# Patient Record
Sex: MALE | Race: WHITE | NOT HISPANIC OR LATINO
[De-identification: names, ages, dates, MRNs, and addresses within clinical notes are randomized per-mention and may not be internally consistent; named-entity substitution may affect disease eponyms.]

---

## 2018-01-30 ENCOUNTER — CHARTING TRANS (OUTPATIENT)
Dept: OTHER | Age: 58
End: 2018-01-30

## 2018-01-30 ENCOUNTER — LAB SERVICES (OUTPATIENT)
Dept: OTHER | Age: 58
End: 2018-01-30

## 2018-01-30 LAB
FLU A AG RAPID SCREEN: NEGATIVE
FLU B AG RAPID SCREEN: NEGATIVE
FLU RAPID SCREEN: NORMAL
SOURCE: NORMAL

## 2018-05-24 PROBLEM — I83.893 VARICOSE VEINS OF BOTH LOWER EXTREMITIES WITH COMPLICATIONS: Status: ACTIVE | Noted: 2018-05-24

## 2018-05-24 PROBLEM — I83.93 SPIDER VEINS OF BOTH LOWER EXTREMITIES: Status: ACTIVE | Noted: 2018-05-24

## 2018-06-28 PROBLEM — I83.891 VARICOSE VEINS OF RIGHT LOWER EXTREMITY WITH COMPLICATIONS: Status: ACTIVE | Noted: 2018-05-24

## 2018-08-03 ENCOUNTER — CHARTING TRANS (OUTPATIENT)
Dept: OTHER | Age: 58
End: 2018-08-03

## 2018-09-11 ENCOUNTER — ANESTHESIA EVENT (OUTPATIENT)
Dept: CARDIAC SURGERY | Facility: HOSPITAL | Age: 58
End: 2018-09-11
Payer: COMMERCIAL

## 2018-09-11 NOTE — ANESTHESIA PREPROCEDURE EVALUATION
PRE-OP EVALUATION    Patient Name: Jerica Ortiz    Pre-op Diagnosis: varicose veins of right lower extremity with complications, asymptomatic varicose veins of bilateral lower extremities  Dr. Kvng Dyson person    Procedure(s):  right leg stab normal.                 Other findings            ASA: 2   Plan: general  NPO status verified and patient meets guidelines. Patient has not taken beta blockers in last 24 hours. Post-procedure pain management plan discussed with surgeon and patient.     C

## 2018-09-12 ENCOUNTER — ANESTHESIA (OUTPATIENT)
Dept: CARDIAC SURGERY | Facility: HOSPITAL | Age: 58
End: 2018-09-12
Payer: COMMERCIAL

## 2018-09-12 ENCOUNTER — HOSPITAL ENCOUNTER (OUTPATIENT)
Facility: HOSPITAL | Age: 58
Setting detail: HOSPITAL OUTPATIENT SURGERY
Discharge: HOME | End: 2018-09-12
Attending: SURGERY | Admitting: SURGERY
Payer: COMMERCIAL

## 2018-09-12 VITALS
DIASTOLIC BLOOD PRESSURE: 84 MMHG | HEART RATE: 65 BPM | OXYGEN SATURATION: 99 % | TEMPERATURE: 98 F | RESPIRATION RATE: 20 BRPM | HEIGHT: 75 IN | WEIGHT: 261 LBS | BODY MASS INDEX: 32.45 KG/M2 | SYSTOLIC BLOOD PRESSURE: 124 MMHG

## 2018-09-12 LAB
ALBUMIN SERPL-MCNC: 3.5 G/DL (ref 3.5–4.8)
ALBUMIN/GLOB SERPL: 1.1 {RATIO} (ref 1–2)
ALP LIVER SERPL-CCNC: 88 U/L (ref 45–117)
ALT SERPL-CCNC: 30 U/L (ref 17–63)
ANION GAP SERPL CALC-SCNC: 4 MMOL/L (ref 0–18)
AST SERPL-CCNC: 21 U/L (ref 15–41)
ATRIAL RATE: 61 BPM
BASOPHILS # BLD AUTO: 0.05 X10(3) UL (ref 0–0.1)
BASOPHILS NFR BLD AUTO: 0.8 %
BILIRUB SERPL-MCNC: 0.6 MG/DL (ref 0.1–2)
BUN BLD-MCNC: 17 MG/DL (ref 8–20)
BUN/CREAT SERPL: 17.7 (ref 10–20)
CALCIUM BLD-MCNC: 8.5 MG/DL (ref 8.3–10.3)
CHLORIDE SERPL-SCNC: 109 MMOL/L (ref 101–111)
CO2 SERPL-SCNC: 29 MMOL/L (ref 22–32)
CREAT BLD-MCNC: 0.96 MG/DL (ref 0.7–1.3)
EOSINOPHIL # BLD AUTO: 0 X10(3) UL (ref 0–0.3)
EOSINOPHIL NFR BLD AUTO: 0 %
ERYTHROCYTE [DISTWIDTH] IN BLOOD BY AUTOMATED COUNT: 12.1 % (ref 11.5–16)
GLOBULIN PLAS-MCNC: 3.3 G/DL (ref 2.5–4)
GLUCOSE BLD-MCNC: 103 MG/DL (ref 70–99)
HCT VFR BLD AUTO: 39.5 % (ref 37–53)
HGB BLD-MCNC: 13.5 G/DL (ref 13–17)
IMMATURE GRANULOCYTE COUNT: 0.01 X10(3) UL (ref 0–1)
IMMATURE GRANULOCYTE RATIO %: 0.2 %
LYMPHOCYTES # BLD AUTO: 1.59 X10(3) UL (ref 0.9–4)
LYMPHOCYTES NFR BLD AUTO: 25.8 %
M PROTEIN MFR SERPL ELPH: 6.8 G/DL (ref 6.1–8.3)
MCH RBC QN AUTO: 32.3 PG (ref 27–33.2)
MCHC RBC AUTO-ENTMCNC: 34.2 G/DL (ref 31–37)
MCV RBC AUTO: 94.5 FL (ref 80–99)
MONOCYTES # BLD AUTO: 0.52 X10(3) UL (ref 0.1–1)
MONOCYTES NFR BLD AUTO: 8.4 %
NEUTROPHIL ABS PRELIM: 3.99 X10 (3) UL (ref 1.3–6.7)
NEUTROPHILS # BLD AUTO: 3.99 X10(3) UL (ref 1.3–6.7)
NEUTROPHILS NFR BLD AUTO: 64.8 %
OSMOLALITY SERPL CALC.SUM OF ELEC: 296 MOSM/KG (ref 275–295)
P AXIS: 21 DEGREES
P-R INTERVAL: 144 MS
PLATELET # BLD AUTO: 255 10(3)UL (ref 150–450)
POTASSIUM SERPL-SCNC: 4.4 MMOL/L (ref 3.6–5.1)
Q-T INTERVAL: 426 MS
QRS DURATION: 100 MS
QTC CALCULATION (BEZET): 428 MS
R AXIS: -17 DEGREES
RBC # BLD AUTO: 4.18 X10(6)UL (ref 4.3–5.7)
RED CELL DISTRIBUTION WIDTH-SD: 42.4 FL (ref 35.1–46.3)
SODIUM SERPL-SCNC: 142 MMOL/L (ref 136–144)
T AXIS: 21 DEGREES
VENTRICULAR RATE: 61 BPM
WBC # BLD AUTO: 6.2 X10(3) UL (ref 4–13)

## 2018-09-12 PROCEDURE — 80053 COMPREHEN METABOLIC PANEL: CPT | Performed by: SURGERY

## 2018-09-12 PROCEDURE — 85025 COMPLETE CBC W/AUTO DIFF WBC: CPT | Performed by: SURGERY

## 2018-09-12 PROCEDURE — 36415 COLL VENOUS BLD VENIPUNCTURE: CPT | Performed by: SURGERY

## 2018-09-12 PROCEDURE — 06DY3ZZ EXTRACTION OF LOWER VEIN, PERCUTANEOUS APPROACH: ICD-10-PCS | Performed by: SURGERY

## 2018-09-12 PROCEDURE — 93005 ELECTROCARDIOGRAM TRACING: CPT

## 2018-09-12 PROCEDURE — 93010 ELECTROCARDIOGRAM REPORT: CPT | Performed by: INTERNAL MEDICINE

## 2018-09-12 RX ORDER — NALOXONE HYDROCHLORIDE 0.4 MG/ML
80 INJECTION, SOLUTION INTRAMUSCULAR; INTRAVENOUS; SUBCUTANEOUS AS NEEDED
Status: DISCONTINUED | OUTPATIENT
Start: 2018-09-12 | End: 2018-09-12

## 2018-09-12 RX ORDER — MORPHINE SULFATE 2 MG/ML
2 INJECTION, SOLUTION INTRAMUSCULAR; INTRAVENOUS EVERY 5 MIN PRN
Status: DISCONTINUED | OUTPATIENT
Start: 2018-09-12 | End: 2018-09-12

## 2018-09-12 RX ORDER — DEXAMETHASONE SODIUM PHOSPHATE 4 MG/ML
VIAL (ML) INJECTION
Status: COMPLETED
Start: 2018-09-12 | End: 2018-09-12

## 2018-09-12 RX ORDER — HYDROCODONE BITARTRATE AND ACETAMINOPHEN 5; 325 MG/1; MG/1
1-2 TABLET ORAL EVERY 4 HOURS PRN
Qty: 10 TABLET | Refills: 0 | Status: SHIPPED | OUTPATIENT
Start: 2018-09-12 | End: 2018-09-14

## 2018-09-12 RX ORDER — HYDROCODONE BITARTRATE AND ACETAMINOPHEN 5; 325 MG/1; MG/1
1 TABLET ORAL AS NEEDED
Status: DISCONTINUED | OUTPATIENT
Start: 2018-09-12 | End: 2018-09-12

## 2018-09-12 RX ORDER — CEFAZOLIN SODIUM 1 G/3ML
INJECTION, POWDER, FOR SOLUTION INTRAMUSCULAR; INTRAVENOUS
Status: DISCONTINUED | OUTPATIENT
Start: 2018-09-12 | End: 2018-09-12 | Stop reason: HOSPADM

## 2018-09-12 RX ORDER — SODIUM CHLORIDE, SODIUM LACTATE, POTASSIUM CHLORIDE, CALCIUM CHLORIDE 600; 310; 30; 20 MG/100ML; MG/100ML; MG/100ML; MG/100ML
INJECTION, SOLUTION INTRAVENOUS CONTINUOUS
Status: DISCONTINUED | OUTPATIENT
Start: 2018-09-12 | End: 2018-09-12

## 2018-09-12 RX ORDER — ONDANSETRON 2 MG/ML
INJECTION INTRAMUSCULAR; INTRAVENOUS
Status: COMPLETED
Start: 2018-09-12 | End: 2018-09-12

## 2018-09-12 RX ORDER — HYDROCODONE BITARTRATE AND ACETAMINOPHEN 5; 325 MG/1; MG/1
2 TABLET ORAL AS NEEDED
Status: DISCONTINUED | OUTPATIENT
Start: 2018-09-12 | End: 2018-09-12

## 2018-09-12 RX ORDER — BUPIVACAINE HYDROCHLORIDE AND EPINEPHRINE 5; 5 MG/ML; UG/ML
INJECTION, SOLUTION EPIDURAL; INTRACAUDAL; PERINEURAL AS NEEDED
Status: DISCONTINUED | OUTPATIENT
Start: 2018-09-12 | End: 2018-09-12 | Stop reason: HOSPADM

## 2018-09-12 RX ORDER — DEXAMETHASONE SODIUM PHOSPHATE 4 MG/ML
4 VIAL (ML) INJECTION EVERY 6 HOURS
Status: DISCONTINUED | OUTPATIENT
Start: 2018-09-12 | End: 2018-09-12

## 2018-09-12 RX ORDER — ACETAMINOPHEN 500 MG
1000 TABLET ORAL ONCE AS NEEDED
Status: DISCONTINUED | OUTPATIENT
Start: 2018-09-12 | End: 2018-09-12

## 2018-09-12 RX ORDER — ONDANSETRON 2 MG/ML
4 INJECTION INTRAMUSCULAR; INTRAVENOUS ONCE
Status: COMPLETED | OUTPATIENT
Start: 2018-09-12 | End: 2018-09-12

## 2018-09-12 NOTE — OR NURSING
Patient was assisted to BR to void. Surgical site began dripping dark red blood at lower inner calf site(about 50cc on floor). Patient assisted back to cart with leg dressing reinforced and leg elevated. Patient denies pain, VS stable.  Plan to phone ramon

## 2018-09-12 NOTE — OR NURSING
Report to Seth Altamirano. Patient resting with leg elevated. Outer Ace wrap removed and rewrapped,no new drainage.  Wife at side with instructions reviewed to keep leg elevated as much as possible per Dr Herminio Isaac and to call surgeon with any questions or concerns(I

## 2018-09-12 NOTE — OPERATIVE REPORT
[address]    PATIENT'S NAME: Aaron ESCOBAR   ATTENDING PHYSICIAN: Libby Mccain. Delaine Runner, M.D. OPERATING PHYSICIAN: Freada Nam A. Delaine Runner, M.D.    PATIENT ACCOUNT#:   [de-identified]    LOCATION:  [address] [place] [address] 10  MEDICAL RECORD #:   [id number] Marcaine 0.5% with epinephrine and all oozing stopped. After completion of the Steri-Strip closure of the 36 incisions, the patient's wounds were wrapped with 4x4, gauze, and Webril followed by Coban.   A final wrapping of a 4-inch and 6-inch Ace wrap nicole

## 2018-09-12 NOTE — BRIEF OP NOTE
Pre-Operative Diagnosis: varicose veins of right lower extremity with complications     Post-Operative Diagnosis: same     Procedure Performed:   Procedure(s):  right leg stab phlebectomy- multiple (36 incisions)      Surgeon(s) and Role:     Ellen Swift

## 2018-09-12 NOTE — OR NURSING
Dr Kwaku Merino phoned and updated. Plan to discharge home after one more hour of observation with leg elevated and wrapped. Patient to keep elevated at home as much as possible  till appointment tomorrow.

## 2018-09-12 NOTE — ANESTHESIA POSTPROCEDURE EVALUATION
[facility] Patient Status:  Inpatient   Age/Gender 59year old male MRN [id number]   Location [address] [facility] Attending Sheryl Moore MD   Hosp Day # 0 PCP Vangie Sweet MD       Anesthesia Post-op Not

## 2018-09-13 NOTE — H&P
History & Physical Examination    Patient Name: Gardenia Novoa  MRN: [id number]  [facility]: [id number]  YOB: 1960    Diagnosis: Varicose veins with complications    Present Illness: Patient is a 59year old male who is admitted today for stab avul Debra Arshad DPM;  Location: [facility]  2014: OTHER SURGICAL HISTORY      Comment:  Oral surgery  Family History   Problem Relation Age of Onset   • Diabetes Father      Social History    Tobacco Use      Smoking status: Never Smoker      Sm

## 2018-11-02 VITALS
TEMPERATURE: 98.1 F | OXYGEN SATURATION: 96 % | SYSTOLIC BLOOD PRESSURE: 120 MMHG | RESPIRATION RATE: 16 BRPM | DIASTOLIC BLOOD PRESSURE: 68 MMHG | HEART RATE: 86 BPM

## 2019-08-05 ENCOUNTER — WALK IN (OUTPATIENT)
Dept: URGENT CARE | Age: 59
End: 2019-08-05

## 2019-08-05 VITALS
RESPIRATION RATE: 18 BRPM | TEMPERATURE: 98.4 F | HEART RATE: 80 BPM | BODY MASS INDEX: 31.08 KG/M2 | WEIGHT: 250 LBS | DIASTOLIC BLOOD PRESSURE: 74 MMHG | HEIGHT: 75 IN | SYSTOLIC BLOOD PRESSURE: 118 MMHG

## 2019-08-05 DIAGNOSIS — J02.9 SORE THROAT: Primary | ICD-10-CM

## 2019-08-05 DIAGNOSIS — J02.0 ACUTE STREPTOCOCCAL PHARYNGITIS: ICD-10-CM

## 2019-08-05 LAB
INTERNAL PROCEDURAL CONTROLS ACCEPTABLE: YES
S PYO AG THROAT QL IA.RAPID: POSITIVE

## 2019-08-05 PROCEDURE — 87880 STREP A ASSAY W/OPTIC: CPT | Performed by: NURSE PRACTITIONER

## 2019-08-05 PROCEDURE — 99214 OFFICE O/P EST MOD 30 MIN: CPT | Performed by: NURSE PRACTITIONER

## 2019-08-05 RX ORDER — BENZONATATE 100 MG/1
100 CAPSULE ORAL 3 TIMES DAILY PRN
Qty: 20 CAPSULE | Refills: 0 | Status: SHIPPED | OUTPATIENT
Start: 2019-08-05 | End: 2019-08-05 | Stop reason: SDUPTHER

## 2019-08-05 RX ORDER — AMOXICILLIN 875 MG/1
875 TABLET, COATED ORAL 2 TIMES DAILY
Qty: 20 TABLET | Refills: 0 | Status: SHIPPED | OUTPATIENT
Start: 2019-08-05 | End: 2019-08-05 | Stop reason: SDUPTHER

## 2019-08-05 RX ORDER — BENZONATATE 100 MG/1
100 CAPSULE ORAL 3 TIMES DAILY PRN
Qty: 20 CAPSULE | Refills: 0 | Status: SHIPPED | OUTPATIENT
Start: 2019-08-05

## 2019-08-05 RX ORDER — AMOXICILLIN 875 MG/1
875 TABLET, COATED ORAL 2 TIMES DAILY
Qty: 20 TABLET | Refills: 0 | Status: SHIPPED | OUTPATIENT
Start: 2019-08-05 | End: 2019-08-15

## 2019-08-05 ASSESSMENT — ENCOUNTER SYMPTOMS
TROUBLE SWALLOWING: 0
COUGH: 1
CHILLS: 0
SINUS PRESSURE: 0
RHINORRHEA: 0
VOICE CHANGE: 0
CHEST TIGHTNESS: 0
FATIGUE: 1
STRIDOR: 0
SHORTNESS OF BREATH: 0
FEVER: 0
WHEEZING: 0
EYES NEGATIVE: 1
SORE THROAT: 1

## 2019-10-11 ENCOUNTER — WALK IN (OUTPATIENT)
Dept: URGENT CARE | Age: 59
End: 2019-10-11

## 2019-10-11 DIAGNOSIS — H61.23 BILATERAL IMPACTED CERUMEN: Primary | ICD-10-CM

## 2019-10-11 PROCEDURE — 69210 REMOVE IMPACTED EAR WAX UNI: CPT | Performed by: NURSE PRACTITIONER

## 2019-10-11 RX ORDER — OFLOXACIN 3 MG/ML
5 SOLUTION AURICULAR (OTIC) DAILY
Qty: 5 ML | Refills: 0 | Status: SHIPPED | OUTPATIENT
Start: 2019-10-11

## 2019-10-11 ASSESSMENT — PAIN SCALES - GENERAL: PAINLEVEL: 0

## 2021-05-26 VITALS
RESPIRATION RATE: 16 BRPM | WEIGHT: 250 LBS | HEART RATE: 61 BPM | DIASTOLIC BLOOD PRESSURE: 78 MMHG | SYSTOLIC BLOOD PRESSURE: 118 MMHG | TEMPERATURE: 97.8 F | HEIGHT: 75 IN | OXYGEN SATURATION: 98 % | BODY MASS INDEX: 31.08 KG/M2

## 2021-10-11 PROBLEM — H25.9 AGE-RELATED CATARACT OF BOTH EYES, UNSPECIFIED AGE-RELATED CATARACT TYPE: Status: ACTIVE | Noted: 2021-10-11

## 2024-01-23 ENCOUNTER — HOSPITAL ENCOUNTER (EMERGENCY)
Age: 64
Discharge: HOME | End: 2024-01-23
Attending: EMERGENCY MEDICINE
Payer: COMMERCIAL

## 2024-01-23 VITALS
HEIGHT: 75 IN | BODY MASS INDEX: 31.08 KG/M2 | SYSTOLIC BLOOD PRESSURE: 141 MMHG | OXYGEN SATURATION: 98 % | RESPIRATION RATE: 16 BRPM | TEMPERATURE: 98 F | HEART RATE: 74 BPM | DIASTOLIC BLOOD PRESSURE: 87 MMHG | WEIGHT: 250 LBS

## 2024-01-23 DIAGNOSIS — S01.01XA LACERATION OF SCALP, INITIAL ENCOUNTER: Primary | ICD-10-CM

## 2024-01-23 PROCEDURE — 99283 EMERGENCY DEPT VISIT LOW MDM: CPT

## 2024-01-23 PROCEDURE — 12002 RPR S/N/AX/GEN/TRNK2.6-7.5CM: CPT

## 2024-01-23 PROCEDURE — 90471 IMMUNIZATION ADMIN: CPT

## 2024-01-23 NOTE — ED PROVIDER NOTES
Laceration repair: Prior to procedure, documentation was reviewed, informed consent was obtained, appropriate equipment was present and a time out was performed to identify the correct patient, procedure and site.     Anesthesia Type: Lidocaine 2% with epi  Location: Posterior scalp  Size: 4 cm  Shape: Linear  FB present: None noted  Cleansing/irrigation: Normal saline under pressure  Wound Closure: Staples x 7  DressingType: Neosporin ointment  Td: Given today  PT Tolerated:without complaint    Total procedure time 10 minutes  Estimated blood loss was less than 0.5 mL. A dressing was applied to the area and anticipatory guidance, as well as standard post-procedure care, was explained. Return precautions are given. The patient tolerated the procedure well without complications.  Follow-up visit set for suture removal and evaluation of the laceration.

## 2024-01-23 NOTE — DISCHARGE INSTRUCTIONS
Wash gently with regular shampoo.  Staples should be removed in about 7 days.    Return to the ER for severe headache, intractable vomiting, mental status changes or any other concerns.  
Statement Selected

## 2024-01-23 NOTE — ED PROVIDER NOTES
Patient Seen in: [facility] In [place]      History     Chief Complaint   Patient presents with    Trauma     Stated Complaint: lac to back of head after fall    Subjective:   HPI    Patient is a 63-year-old male who presents with a laceration over his occipital scalp after a fall at home.  He slipped on ice exiting the garage and went backwards, striking the back of his head.  Minimal headache, no loss of consciousness, nausea, lightheadedness.  No other injuries or complaints.    Objective:   Past Medical History:   Diagnosis Date    COVID 03/2020    no residual effects     Spider veins of both lower extremities 5/24/2018    Spider veins of both lower extremities 5/24/2018    Varicose veins of both lower extremities with complications 5/24/2018              Past Surgical History:   Procedure Laterality Date    CATARACT Left 11/01/2021    HALLUX RIGIDUS CORRECTION Right 4/7/2016    Procedure: CHEILECTOMY;  Surgeon: Jw Weinstein DPM;  Location: [facility]    OTHER SURGICAL HISTORY  2014    Oral surgery                No pertinent social history.            Review of Systems    Positive for stated complaint: lac to back of head after fall  Other systems are as noted in HPI.  Constitutional and vital signs reviewed.      All other systems reviewed and negative except as noted above.    Physical Exam     ED Triage Vitals [01/23/24 0802]   /87   Pulse 74   Resp 16   Temp 97.6 °F (36.4 °C)   Temp src Oral   SpO2 98 %   O2 Device None (Room air)       Current:/87   Pulse 74   Temp 97.6 °F (36.4 °C) (Oral)   Resp 16   Ht 190.5 cm (6' 3\")   Wt 113.4 kg   SpO2 98%   BMI 31.25 kg/m²         Physical Exam  Vitals and nursing note reviewed.   Constitutional:       Appearance: He is well-developed.   HENT:      Head: Normocephalic.      Comments: 4 cm linear laceration over the occipital scalp with mild surrounding soft tissue swelling.  Eyes:      Conjunctiva/sclera:  Conjunctivae normal.      Pupils: Pupils are equal, round, and reactive to light.   Cardiovascular:      Rate and Rhythm: Normal rate and regular rhythm.      Heart sounds: Normal heart sounds.   Pulmonary:      Effort: Pulmonary effort is normal.      Breath sounds: Normal breath sounds.   Abdominal:      General: Bowel sounds are normal.      Palpations: Abdomen is soft.   Musculoskeletal:         General: Normal range of motion.      Cervical back: Normal range of motion and neck supple.   Skin:     General: Skin is warm and dry.   Neurological:      Mental Status: He is alert and oriented to person, place, and time.               ED Course   Labs Reviewed - No data to display                   MDM      63-year-old male presenting with an occipital scalp laceration sustained after fall at home.  Slipped on ice and fell backwards, striking his head.  No other injuries or complaints.  Discussed a CT but he is headache is fairly minimal and he has no nausea, lightheadedness, mental status changes suspicion for skull fracture or ICH is low.  Laceration will be irrigated and closed.      Update at 9 AM.  Laceration repaired as noted.  Tetanus updated.  Instructed on wound care as well as return precautions.      Past Medical History-none    Differential diagnosis before testing included skull fracture, ICH    Co-morbidities that add to the complexity of management include: None    Testing ordered during this visit included none      History obtained by an independent source included from wife at bedside            Disposition:        Discharge  I have discussed with the patient the results of test, differential diagnosis, treatment plan, warning signs and symptoms which should prompt immediate return.  They expressed understanding of these instructions and agrees to the following plan provided.  They were given written discharge instructions and agrees to return for any concerns and voiced understanding and all  questions were answered.                           Medical Decision Making      Disposition and Plan     Clinical Impression:  1. Laceration of scalp, initial encounter         Disposition:  Discharge  1/23/2024  9:00 am    Follow-up:  Bulmaro Yan MD  [address] [place] [address] [phone]    Follow up            Medications Prescribed:  Current Discharge Medication List

## 2024-02-02 ENCOUNTER — HOSPITAL ENCOUNTER (EMERGENCY)
Age: 64
Discharge: HOME | End: 2024-02-02
Attending: EMERGENCY MEDICINE
Payer: COMMERCIAL

## 2024-02-02 VITALS
HEART RATE: 66 BPM | BODY MASS INDEX: 31 KG/M2 | OXYGEN SATURATION: 100 % | WEIGHT: 249.13 LBS | SYSTOLIC BLOOD PRESSURE: 122 MMHG | TEMPERATURE: 98 F | RESPIRATION RATE: 16 BRPM | DIASTOLIC BLOOD PRESSURE: 84 MMHG

## 2024-02-02 DIAGNOSIS — Z48.02 ENCOUNTER FOR STAPLE REMOVAL: Primary | ICD-10-CM

## 2024-02-02 NOTE — ED PROVIDER NOTES
Patient Seen in: [facility] In [place]      History     Chief Complaint   Patient presents with    Sut Stap RingRemoval     Stated Complaint: staple removal    Subjective:   HPI    63-year-old male presents for removal of staples from his scalp however placed on 1/23/2024 after he slipped on ice and hit his head on the ground.  He states he is having no pain or drainage from the site.  Denies headaches.    Objective:   Past Medical History:   Diagnosis Date    COVID 03/2020    no residual effects     Spider veins of both lower extremities 5/24/2018    Spider veins of both lower extremities 5/24/2018    Varicose veins of both lower extremities with complications 5/24/2018              Past Surgical History:   Procedure Laterality Date    CATARACT Left 11/01/2021    HALLUX RIGIDUS CORRECTION Right 4/7/2016    Procedure: CHEILECTOMY;  Surgeon: Jw Weinstein DPM;  Location: [facility]    OTHER SURGICAL HISTORY  2014    Oral surgery                No pertinent social history.            Review of Systems    Positive for stated complaint: staple removal  Other systems are as noted in HPI.  Constitutional and vital signs reviewed.      All other systems reviewed and negative except as noted above.    Physical Exam     ED Triage Vitals [02/02/24 0709]   /84   Pulse 66   Resp 16   Temp 98 °F (36.7 °C)   Temp src Temporal   SpO2 100 %   O2 Device None (Room air)       Current:/84   Pulse 66   Temp 98 °F (36.7 °C) (Temporal)   Resp 16   Wt 113 kg   SpO2 100%   BMI 31.14 kg/m²         Physical Exam  Vitals and nursing note reviewed.   Constitutional:       General: He is not in acute distress.     Appearance: He is well-developed. He is not ill-appearing.   HENT:      Head: Normocephalic.        Mouth/Throat:      Mouth: Mucous membranes are moist.   Musculoskeletal:      Cervical back: Neck supple.   Skin:     General: Skin is warm and dry.   Neurological:      Mental Status: He  is alert and oriented to person, place, and time.      GCS: GCS eye subscore is 4. GCS verbal subscore is 5. GCS motor subscore is 6.   Psychiatric:         Mood and Affect: Mood normal.         Behavior: Behavior normal.               ED Course   Labs Reviewed - No data to display                 MDM   63-year-old male presents for removal of staples from his scalp however placed on 1/23/2024 after he slipped on ice and hit his head on the ground.      Laceration closed without signs of infection.  Staples removed x 7 without difficulty.  Return precaution discussed.    Medical Decision Making  Amount and/or Complexity of Data Reviewed  External Data Reviewed: notes.     Details: Chart reviewed from ED visit on 1/23/2024 when patient sustained laceration to his head after fall.  7 staples were placed.        Disposition and Plan     Clinical Impression:  1. Encounter for staple removal         Disposition:  Discharge  2/2/2024  7:18 am    Follow-up:  Bulmaro Yan MD  [address] [place] [address]  [phone]    Follow up            Medications Prescribed:  Current Discharge Medication List

## (undated) DEVICE — #11 STERILE BLADE: Brand: POLYMER COATED BLADES

## (undated) DEVICE — HOOK LOCK LATEX FREE ELASTIC BANDAGE 6INX5YD

## (undated) DEVICE — #15 STERILE STAINLESS BLADE: Brand: STERILE STAINLESS BLADES

## (undated) DEVICE — ABDOMINAL PAD: Brand: DERMACEA

## (undated) DEVICE — CHLORAPREP 26ML APPLICATOR

## (undated) DEVICE — CV PACK-LF: Brand: MEDLINE INDUSTRIES, INC.

## (undated) DEVICE — HOOK LOCK LATEX FREE ELASTIC BANDAGE 4INX5YD

## (undated) DEVICE — SOL  .9 1000ML BTL

## (undated) DEVICE — Device

## (undated) DEVICE — TRANSPOSAL ULTRAFLEX DUO/QUAD ULTRA CART MANIFOLD

## (undated) DEVICE — 3M™ COBAN™ NL STERILE NON-LATEX SELF-ADHERENT WRAP, 2084S, 4 IN X 5 YD (10 CM X 4,5 M), 18 ROLLS/CASE: Brand: 3M™ COBAN™

## (undated) DEVICE — BANDAGE ROLL,100% COTTON, 6 PLY, LARGE: Brand: KERLIX

## (undated) DEVICE — GAUZE SPONGES,12 PLY: Brand: CURITY

## (undated) DEVICE — PADDING CAST SOFT ROLL 6\" STER

## (undated) DEVICE — GLOVE SURG SENSICARE SZ 8

## (undated) DEVICE — PADDING CAST SOFT ROLL 4\" STER

## (undated) DEVICE — ADHESIVE MASTISOL 2/3CC VL

## (undated) DEVICE — GLOVE BIOGEL M SURG SZ 8

## (undated) DEVICE — 3M™ STERI-STRIP™ REINFORCED ADHESIVE SKIN CLOSURES, R1547, 1/2 IN X 4 IN (12 MM X 100 MM), 6 STRIPS/ENVELOPE: Brand: 3M™ STERI-STRIP™

## (undated) DEVICE — GOWN SURG AERO CHROME XXL

## (undated) NOTE — LETTER
[facility]  [address] [facility], [address] [place] [address]    Consent for Operation    Date: __________________    Time: _______________    1.  I authorize the performance upon Ccey Quiñonez the following operation:    Procedure(s):  right leg stab phle videotape. [facility] will not be responsible for storage or maintenance of this tape. 6. For the purpose of advancing medical education, I consent to the admittance of observers to the Operating Room.     7. I authorize the use of any specimen, organs ___________________________    When the patient is a minor or mentally incompetent to give consent:  Signature of person authorized to consent for patient: ___________________________   Relationship to patient: _____________________________________________ medicines may increase my risk of anesthetic complications. · If I am allergic to anything or have had a reaction to anesthesia before. 3. I understand how the anesthesia medicine will help me (benefits).     4. I understand that with any type of anesth patient or their representative has agreed to have anesthesia services.     _____________________________________________________________________________  Witness        Date   Time  I have verified that the signature is that of the patient or patient’s rep

## (undated) NOTE — LETTER
[facility]  [address] 59 [address] [facility], [address] [place] [address]    Consent for Operation    Date: __________________    Time: _______________    1.  I authorize the performance upon Redell Baumgarten the following operation:    Procedure(s):    right leg stab ph procedure has been videotaped, the surgeon will obtain the original videotape. The hospital will not be responsible for storage or maintenance of this tape.     6. For the purpose of advancing medical education, I consent to the admittance of observers to t STATEMENTS REQUIRING INSERTION OR COMPLETION WERE FILLED IN.     Signature of Patient:   ___________________________    When the patient is a minor or mentally incompetent to give consent:  Signature of person authorized to consent for patient: ____________ supplements, and pills I can buy without a prescription (including street drugs/illegal medications). Failure to inform my anesthesiologist about these medicines may increase my risk of anesthetic complications.   · If I am allergic to anything or have had Anesthesiologist Signature     Date   Time  I have discussed the procedure and information above with the patient (or patient’s representative) and answered their questions. The patient or their representative has agreed to have anesthesia services.     ___